# Patient Record
Sex: FEMALE | URBAN - METROPOLITAN AREA
[De-identification: names, ages, dates, MRNs, and addresses within clinical notes are randomized per-mention and may not be internally consistent; named-entity substitution may affect disease eponyms.]

---

## 2022-03-30 ENCOUNTER — APPOINTMENT (OUTPATIENT)
Dept: URBAN - METROPOLITAN AREA CLINIC 256 | Age: 27
Setting detail: DERMATOLOGY
End: 2022-03-30

## 2022-03-30 VITALS — RESPIRATION RATE: 15 BRPM | WEIGHT: 120 LBS | HEIGHT: 66 IN

## 2022-03-30 DIAGNOSIS — L65.9 NONSCARRING HAIR LOSS, UNSPECIFIED: ICD-10-CM

## 2022-03-30 PROCEDURE — OTHER ADDITIONAL NOTES: OTHER

## 2022-03-30 PROCEDURE — OTHER MIPS QUALITY: OTHER

## 2022-03-30 PROCEDURE — OTHER COUNSELING: OTHER

## 2022-03-30 PROCEDURE — 99202 OFFICE O/P NEW SF 15 MIN: CPT

## 2022-03-30 ASSESSMENT — LOCATION ZONE DERM: LOCATION ZONE: SCALP

## 2022-03-30 ASSESSMENT — LOCATION DETAILED DESCRIPTION DERM: LOCATION DETAILED: LEFT SUPERIOR PARIETAL SCALP

## 2022-03-30 ASSESSMENT — LOCATION SIMPLE DESCRIPTION DERM: LOCATION SIMPLE: SCALP

## 2022-03-30 NOTE — HPI: HAIR LOSS
Previous Labs: Yes
Additional History: She first noticed it in photos last September or October. It’s hard to tell as she had a lot of hair before. Her father does have alopecia totalis, started when he was 36, has no hair, lashes, brows. Her brother has male pattern baldness which started very young in high school. She wants a clear diagnosis. \\nSivelisse has had a lot of stress, about three months before her hair loss started. She thought it would continue for 3-6 months but then it got worse in month 7. Stress was school, moving, finance. She didn’t have Covid to her knowledge but did have some flu like illness at some point. She has food allergies and cut out dairy and gluten about two years ago and wonders if she has a weird vitamin deficiency. She takes multivitamins and hair gummies.
When Were The Labs Drawn? (Drawn...): Thyroid labs
Lab Details: TSH negative

## 2022-03-30 NOTE — PROCEDURE: ADDITIONAL NOTES
Render Risk Assessment In Note?: no
Detail Level: Simple
Additional Notes: Initiate your minoxidil. Will consider scalp biopsy if all tests are negative. Briefly discussed the potential association of PCOS and hairloss as pt does state she has irregular periods. Venipuncture attempted in both right and left AC, no specimen obtained. Sent with lab order for CBC, CMP, Iron and ferritin panel, vitamin D, Zinc, and DHEAS. Patient already had normal thyroid done.
Additional Notes: Continue prenatal vitamin\\n\\nA clinical assistant was present for the exam. Care instructions were explained to the patient in detail. Told patient to call with any concerns or questions. The patient verbalized understanding and agreement of the education provided and the treatment plan. Encouraged patient to schedule a follow up appointment right after visit. At the end of the visit, all questions had been answered and the patient was satisfied with the visit.

## 2023-04-16 LAB — SARS-COV-2 RNA RESP QL NAA+PROBE: NEGATIVE

## 2023-04-16 PROCEDURE — C9803 HOPD COVID-19 SPEC COLLECT: HCPCS

## 2023-04-16 PROCEDURE — U0005 INFEC AGEN DETEC AMPLI PROBE: HCPCS | Performed by: EMERGENCY MEDICINE

## 2023-04-16 PROCEDURE — 99285 EMERGENCY DEPT VISIT HI MDM: CPT | Mod: 25,CS

## 2023-04-16 ASSESSMENT — ENCOUNTER SYMPTOMS: NERVOUS/ANXIOUS: 1

## 2023-04-17 ENCOUNTER — HOSPITAL ENCOUNTER (OUTPATIENT)
Facility: CLINIC | Age: 28
Setting detail: OBSERVATION
Discharge: HOME | End: 2023-04-18
Attending: EMERGENCY MEDICINE | Admitting: EMERGENCY MEDICINE
Payer: COMMERCIAL

## 2023-04-17 DIAGNOSIS — F41.9 ANXIETY: ICD-10-CM

## 2023-04-17 DIAGNOSIS — F33.1 MAJOR DEPRESSIVE DISORDER, RECURRENT EPISODE, MODERATE (H): ICD-10-CM

## 2023-04-17 DIAGNOSIS — F41.1 GAD (GENERALIZED ANXIETY DISORDER): ICD-10-CM

## 2023-04-17 DIAGNOSIS — F90.0 ATTENTION DEFICIT HYPERACTIVITY DISORDER (ADHD), PREDOMINANTLY INATTENTIVE TYPE: ICD-10-CM

## 2023-04-17 PROCEDURE — 250N000013 HC RX MED GY IP 250 OP 250 PS 637: Performed by: PSYCHIATRY & NEUROLOGY

## 2023-04-17 PROCEDURE — 99223 1ST HOSP IP/OBS HIGH 75: CPT | Performed by: PSYCHIATRY & NEUROLOGY

## 2023-04-17 PROCEDURE — 250N000013 HC RX MED GY IP 250 OP 250 PS 637: Performed by: STUDENT IN AN ORGANIZED HEALTH CARE EDUCATION/TRAINING PROGRAM

## 2023-04-17 PROCEDURE — G0378 HOSPITAL OBSERVATION PER HR: HCPCS

## 2023-04-17 PROCEDURE — 90791 PSYCH DIAGNOSTIC EVALUATION: CPT

## 2023-04-17 RX ORDER — CITALOPRAM HYDROBROMIDE 20 MG/1
20 TABLET ORAL DAILY
Status: DISCONTINUED | OUTPATIENT
Start: 2023-04-17 | End: 2023-04-18 | Stop reason: HOSPADM

## 2023-04-17 RX ORDER — DEXTROAMPHETAMINE SACCHARATE, AMPHETAMINE ASPARTATE, DEXTROAMPHETAMINE SULFATE AND AMPHETAMINE SULFATE 5; 5; 5; 5 MG/1; MG/1; MG/1; MG/1
TABLET ORAL
COMMUNITY

## 2023-04-17 RX ORDER — SPIRONOLACTONE 50 MG/1
50 TABLET, FILM COATED ORAL DAILY
COMMUNITY

## 2023-04-17 RX ORDER — TRAZODONE HYDROCHLORIDE 50 MG/1
50 TABLET, FILM COATED ORAL
Status: DISCONTINUED | OUTPATIENT
Start: 2023-04-17 | End: 2023-04-18 | Stop reason: HOSPADM

## 2023-04-17 RX ORDER — MINOXIDIL 2.5 MG/1
1.25 TABLET ORAL DAILY
COMMUNITY

## 2023-04-17 RX ORDER — OLANZAPINE 5 MG/1
5 TABLET, ORALLY DISINTEGRATING ORAL EVERY 6 HOURS PRN
Status: DISCONTINUED | OUTPATIENT
Start: 2023-04-17 | End: 2023-04-18 | Stop reason: HOSPADM

## 2023-04-17 RX ORDER — BUSPIRONE HYDROCHLORIDE 5 MG/1
5 TABLET ORAL 3 TIMES DAILY PRN
COMMUNITY
End: 2023-04-17

## 2023-04-17 RX ORDER — LANOLIN ALCOHOL/MO/W.PET/CERES
3 CREAM (GRAM) TOPICAL
Status: DISCONTINUED | OUTPATIENT
Start: 2023-04-17 | End: 2023-04-18 | Stop reason: HOSPADM

## 2023-04-17 RX ORDER — HYDROXYZINE HYDROCHLORIDE 25 MG/1
25 TABLET, FILM COATED ORAL 3 TIMES DAILY PRN
Status: DISCONTINUED | OUTPATIENT
Start: 2023-04-17 | End: 2023-04-18 | Stop reason: HOSPADM

## 2023-04-17 RX ORDER — HYDROXYZINE HYDROCHLORIDE 50 MG/1
50 TABLET, FILM COATED ORAL 3 TIMES DAILY PRN
Status: DISCONTINUED | OUTPATIENT
Start: 2023-04-17 | End: 2023-04-17

## 2023-04-17 RX ORDER — LORAZEPAM 1 MG/1
1 TABLET ORAL EVERY 4 HOURS PRN
Status: DISCONTINUED | OUTPATIENT
Start: 2023-04-17 | End: 2023-04-17

## 2023-04-17 RX ORDER — CITALOPRAM HYDROBROMIDE 20 MG/1
20 TABLET ORAL DAILY
Qty: 15 TABLET | Refills: 1 | Status: SHIPPED | OUTPATIENT
Start: 2023-04-17

## 2023-04-17 RX ADMIN — MELATONIN TAB 3 MG 3 MG: 3 TAB at 01:50

## 2023-04-17 RX ADMIN — HYDROXYZINE HYDROCHLORIDE 25 MG: 25 TABLET, FILM COATED ORAL at 13:05

## 2023-04-17 RX ADMIN — CITALOPRAM HYDROBROMIDE 20 MG: 20 TABLET ORAL at 12:52

## 2023-04-17 RX ADMIN — HYDROXYZINE HYDROCHLORIDE 50 MG: 50 TABLET, FILM COATED ORAL at 04:25

## 2023-04-17 ASSESSMENT — ACTIVITIES OF DAILY LIVING (ADL)
ADLS_ACUITY_SCORE: 35

## 2023-04-17 ASSESSMENT — COLUMBIA-SUICIDE SEVERITY RATING SCALE - C-SSRS
6. HAVE YOU EVER DONE ANYTHING, STARTED TO DO ANYTHING, OR PREPARED TO DO ANYTHING TO END YOUR LIFE?: NO
1. IN THE PAST MONTH, HAVE YOU WISHED YOU WERE DEAD OR WISHED YOU COULD GO TO SLEEP AND NOT WAKE UP?: YES
1. HAVE YOU WISHED YOU WERE DEAD OR WISHED YOU COULD GO TO SLEEP AND NOT WAKE UP?: YES
2. HAVE YOU ACTUALLY HAD ANY THOUGHTS OF KILLING YOURSELF?: NO
ATTEMPT LIFETIME: NO
REASONS FOR IDEATION PAST MONTH: EQUALLY TO GET ATTENTION, REVENGE, OR A REACTION FROM OTHERS AND TO END/STOP THE PAIN
TOTAL  NUMBER OF INTERRUPTED ATTEMPTS LIFETIME: NO
TOTAL  NUMBER OF ABORTED OR SELF INTERRUPTED ATTEMPTS LIFETIME: NO
REASONS FOR IDEATION LIFETIME: EQUALLY TO GET ATTENTION, REVENGE, OR A REACTION FROM OTHERS AND TO END/STOP THE PAIN
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
## 2023-04-17 NOTE — ED NOTES
Patient experiencing a panic attack and feels her life is spinning out of control. Medicated with Hydroxyzine 25 mg. Would like to go to her out patient Doctor's appointment on Tuesday so she can get her medication ordered. Dr Tiara Multani is her provider.

## 2023-04-17 NOTE — ED TRIAGE NOTES
Pt has had anxiety and panic attacks for the last year    Pt came in today to get help with managing her anxiety    Pt states that her home environment triggers her    Pt takes sertraline for anxiety     Pt was  Referred to come in by cope     pt contracts for safety

## 2023-04-17 NOTE — PLAN OF CARE
Génesis Patel  April 17, 2023  Plan of Care Hand-off Note     Patient Care Path: Observation    Plan for Care:     Pt presents to the ED with concern of increase anxiety, depression, and passive suicidal ideation. Pt reports worsening symptoms including lack of appetite, sleep disturbance, impacting her daily functioning, dissociative symptoms, and recent passive suicidal ideation.   A lower level of care has been unsuccessful in treating and stabilizing patient s mental health symptoms. [facility] monitoring, treatment and therapeutic intervention of mental health symptoms. Observation at [facility] could help mitigate the need for a more restrictive level of care in an inpatient setting.    Recommendation of reassessment on 04/18/2023 to assess severity of anxiety and depressive symptoms, impact of new medication, and discussing next level of care and appropriate resources.    Critical Safety Issues: increase in anxiety, depression, recent passive suicidal ideation.    Overview:  This patient is a child/adolescent: No    This patient has additional special visitor precautions: No    Legal Status: Voluntary    Contacts:   Conrado Koroma, friend: Contact [phone]    Psychiatry Consult:  Pt will have access to a psychiatric provider while at [facility].    Updated RN and Consulting Psychiatric Provider regarding plan of care.    Kem Hernandez, [facility]

## 2023-04-17 NOTE — ED NOTES
4/16/2023  Génesis Patel 1995     Writer consulted with ED provider, logan staff,  on this date at 3:15:00:AM AM. It was determined that pt would not benefit from assessment at this time due to Pt unable able to participate in assessment-- pt sleeping staff did not want to wake    ED will call DEC at [phone] when pt is ready and able to participate in assessment. (unless there is [facility] available at that time)     OR DEC order has been closed at this time.    Jaja Suarez, [facility]
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
## 2023-04-17 NOTE — ED PROVIDER NOTES
[facility] Unit - Initial Psychiatric Observation Note  [facility] Emergency Department  Observation Initiation Date: Apr 16, 2023    Génesis Patel MRN: [id number]   Age: 27 year old YOB: 1995     History     Chief Complaint   Patient presents with     Anxiety     HPI  Génesis Patel is a 27 year old female with a past history notable for major depressive disorder and generalized anxiety disorder with occasional panic attacks.  She also reports a historical diagnosis of ADHD.  Records indicate that she presented to the emergency department reporting worsening depressed mood and heightened anxiety.  She was determined to be medically stable and transferred to the [facility] unit for psychiatric assessment.  On examination, she recalls that over the past few months, she notes increased psychosocial stressors attributed to a change in her living environment.  She admits that limited adherence to her prescribed medications further contributes to escalating mood and anxiety symptoms.  She notes that she often dissociates and experiences panic attacks.  She feels overwhelmed by routine tasks.  She denied suicidal and homicidal thoughts.  There was no indication of psychosis.  She notes that she has been prescribed Zoloft however rarely takes the medicine due to her own inability to keep up with a reliable schedule of taking the medicine.  She has also struggled with maintaining regular adherence to Adderall which she attributes to some hesitancy from her prescriber and prescribing the medication.  She states that she has been on this medication for nearly 20 years and struggles to function without access to the medication.  Due to the difficulties she has had with her most recent prescriber, her care has been transferred to a different prescriber within the same clinic.  That appointment is set to occur tomorrow.  Her treatment goals are to gain medications that may more effectively provide relief of her mood  "and anxiety symptoms, inquiring about as needed medications for managing anxiety and considering changing antidepressants to Celexa.  She further adds that she has a friend or 2 who take this medication and are doing quite well.  She does not feel ready to discharge back to the community today due to fear that symptoms will continue to worsen.    Past Medical History  No past medical history on file.  No past surgical history on file.  No current outpatient medications on file.    No Known Allergies  Family History  No family history on file.  Social History   Social History     Tobacco Use     Smoking status: Never     Smokeless tobacco: Never          Review of Systems  A medically appropriate review of systems was performed with pertinent positives and negatives noted in the HPI, and all other systems negative.    Physical Examination   BP: 119/84  Pulse: 61  Temp: 98  F (36.7  C)  Resp: 16  Height: 167.6 cm (5' 6\")  Weight: 59.9 kg (132 lb)  SpO2: 100 %    Physical Exam  General: Appears stated age.   Neuro: Alert and fully oriented. Extremities appear to demonstrate normal strength on visual inspection.   Integumentary/Skin: no rash visualized, normal color    Psychiatric Examination   Appearance: awake, alert  Attitude:  cooperative  Eye Contact:  fair  Mood:  anxious and sad   Affect:  intensity is blunted  Speech:  clear, coherent  Psychomotor Behavior:  no evidence of tardive dyskinesia, dystonia, or tics  Thought Process:  logical and linear  Associations:  no loose associations  Thought Content:  no evidence of suicidal ideation or homicidal ideation and no evidence of psychotic thought  Insight:  fair  Judgement:  fair  Oriented to:  time, person, and place  Attention Span and Concentration:  fair  Recent and Remote Memory:  fair  Language: able to name/identify objects without impairment  Fund of Knowledge: intact with awareness of current and past events    ED Course        Labs Ordered and Resulted " from Time of ED Arrival to Time of ED Departure   COVID-19 VIRUS (CORONAVIRUS) BY PCR - Normal       Result Value    SARS CoV2 PCR Negative         Assessments & Plan (with Medical Decision Making)   Patient presenting with depressed mood and heightened anxiety.  She reports limited adherence to Zoloft and difficulty obtaining prescriptions of Adderall which has led to a state of dysfunction and high symptom intensity.  She is seeking ways to better structure her environment to better promote adherence to her psychotropic medications. Nursing notes reviewed noting no acute issues.     I have reviewed the assessment completed by the [facility].     During the observation period, the patient did not require medications for agitation, and did not require restraints/seclusion for patient and/or provider safety.     The patient was found to have a psychiatric condition that would benefit from an observation stay in the emergency department for further psychiatric stabilization and/or coordination of a safe disposition. The observation plan includes serial assessments of psychiatric condition, potential administration of medications if indicated, further disposition pending the patient's psychiatric course during the monitoring period.     Preliminary diagnosis:    ICD-10-CM    1. Anxiety  F41.9       2. CHETAN (generalized anxiety disorder)  F41.1       3. Major depressive disorder, recurrent episode, moderate (H)  F33.1       4. Attention deficit hyperactivity disorder (ADHD), predominantly inattentive type  F90.0            Treatment Plan:  -Discontinue Zoloft to allow for a trial of Celexa as per patient's preference.  Celexa will target symptoms of CHETAN and MDD.  Risks and benefits have been reviewed.  Celexa will be started at a dose of 20 mg daily.  -Vistaril at a dose of 25 mg 3 times a day as needed for reduction of anxiety will also be available until Celexa has provided adequate reduction of symptoms.  -She will follow  up with her outpatient prescriber to discuss future prescriptions of Adderall.  [place] prescription monitoring database was reviewed noting that her last prescription was about 1 month ago.  The database shows a prescription history dating back to about 1 year ago in regards to prescriptions of stimulants.  -Resources for outpatient mental health treatment  -The patient is not interested in pursuing IRTS facilities at this time  -The patient is declining resources for IOP  -Observation status as we monitor her response to the treatment plan outlined above.  Consider transitioning to a crisis facility tomorrow.    --  Wilbert Hooper MD   [facility] EMERGENCY DEPT  [facility] Unit  4/16/2023        Wilbert Hooper MD  04/17/23 [id number]

## 2023-04-18 VITALS
OXYGEN SATURATION: 97 % | HEART RATE: 76 BPM | SYSTOLIC BLOOD PRESSURE: 108 MMHG | TEMPERATURE: 98.5 F | WEIGHT: 132 LBS | DIASTOLIC BLOOD PRESSURE: 71 MMHG | RESPIRATION RATE: 18 BRPM | HEIGHT: 66 IN | BODY MASS INDEX: 21.21 KG/M2

## 2023-04-18 PROCEDURE — 250N000013 HC RX MED GY IP 250 OP 250 PS 637: Performed by: STUDENT IN AN ORGANIZED HEALTH CARE EDUCATION/TRAINING PROGRAM

## 2023-04-18 PROCEDURE — 99239 HOSP IP/OBS DSCHRG MGMT >30: CPT | Performed by: PSYCHIATRY & NEUROLOGY

## 2023-04-18 PROCEDURE — 250N000013 HC RX MED GY IP 250 OP 250 PS 637: Performed by: PSYCHIATRY & NEUROLOGY

## 2023-04-18 PROCEDURE — G0378 HOSPITAL OBSERVATION PER HR: HCPCS | Mod: CS

## 2023-04-18 RX ORDER — HYDROXYZINE PAMOATE 25 MG/1
25 CAPSULE ORAL 3 TIMES DAILY PRN
Qty: 20 CAPSULE | Refills: 0 | Status: SHIPPED | OUTPATIENT
Start: 2023-04-18

## 2023-04-18 RX ADMIN — CITALOPRAM HYDROBROMIDE 20 MG: 20 TABLET ORAL at 07:53

## 2023-04-18 RX ADMIN — MELATONIN TAB 3 MG 3 MG: 3 TAB at 02:13

## 2023-04-18 ASSESSMENT — ACTIVITIES OF DAILY LIVING (ADL)
ADLS_ACUITY_SCORE: 35

## 2023-04-18 ASSESSMENT — COLUMBIA-SUICIDE SEVERITY RATING SCALE - C-SSRS
SUICIDE, SINCE LAST CONTACT: NO
TOTAL  NUMBER OF ABORTED OR SELF INTERRUPTED ATTEMPTS SINCE LAST CONTACT: NO
1. SINCE LAST CONTACT, HAVE YOU WISHED YOU WERE DEAD OR WISHED YOU COULD GO TO SLEEP AND NOT WAKE UP?: NO
ATTEMPT SINCE LAST CONTACT: NO
TOTAL  NUMBER OF INTERRUPTED ATTEMPTS SINCE LAST CONTACT: NO
6. HAVE YOU EVER DONE ANYTHING, STARTED TO DO ANYTHING, OR PREPARED TO DO ANYTHING TO END YOUR LIFE?: NO
2. HAVE YOU ACTUALLY HAD ANY THOUGHTS OF KILLING YOURSELF?: NO

## 2023-04-18 NOTE — ED NOTES
Pt struggled to fall asleep in sensory room and made a request for Melatonin around 2 am. Pt appeared to be sleeping thereafter. However, it is hard to tell with 100% certainty whether she was resting with her eyes closed or actually sleeping. No other issues overnight, otherwise. Will continue to monitor.

## 2023-04-18 NOTE — PROGRESS NOTES
Pt hesitant about discharge. Pt was encouraged multiple times to make a phone call to arrange transportation. Pt says she needs a little more time to calm down before she can make that phone call.

## 2023-04-18 NOTE — ED PROVIDER NOTES
"EmPATH Unit - Psychiatric Observation Discharge Summary  [facility] Emergency Department  Discharge Date: 4/18/2023    Génesis Patel MRN: [id number]   Age: 27 year old YOB: 1995     Brief HPI & Initial ED Course     Chief Complaint   Patient presents with     Anxiety     HPI  Génesis Patel is a 27 year old female with history notable for major depressive disorder, CHETAN, and ADHD who is currently under observation status on the EmPATH unit, now nearing 39 hours in the emergency department.  Overnight, there were no acute issues.  On reassessment today, the patient reports having a good initial meeting with her new outpatient prescriber.  She slept well last night.  Appetite is fair.  She continues to find the milieu triggering, occasionally worsening her anxiety or making it hard to de-escalate dissociative episodes.  She took a dose of hydroxyzine yesterday to help with that process however found the environment to triggering.  She denies suicidal and homicidal thoughts.  There was no indication of psychosis.  She is tolerating Celexa without side effects.  She is hoping to formulate a concrete treatment plan including a referral to a new outpatient therapist.  Otherwise, she interprets readiness to discharge back to the community.        Physical Examination   BP: 108/71  Pulse: 76  Temp: 98.5  F (36.9  C)  Resp: 18  Height: 167.6 cm (5' 6\")  Weight: 59.9 kg (132 lb)  SpO2: 97 %    Physical Exam  General: Appears stated age.   Neuro: Alert and fully oriented. Extremities appear to demonstrate normal strength on visual inspection.   Integumentary/Skin: no rash visualized, normal color    Psychiatric Examination   Appearance: awake, alert  Attitude:  cooperative  Eye Contact:  fair  Mood:  better  Affect:  appropriate and in normal range  Speech:  clear, coherent  Psychomotor Behavior:  no evidence of tardive dyskinesia, dystonia, or tics  Thought Process:  logical and linear  Associations:  no " loose associations  Thought Content:  no evidence of suicidal ideation or homicidal ideation and no evidence of psychotic thought  Insight:  fair  Judgement:  intact  Oriented to:  time, person, and place  Attention Span and Concentration:  fair  Recent and Remote Memory:  fair  Language: able to name/identify objects without impairment  Fund of Knowledge: intact with awareness of current and past events    Results        Labs Ordered and Resulted from Time of ED Arrival to Time of ED Departure   COVID-19 VIRUS (CORONAVIRUS) BY PCR - Normal       Result Value    SARS CoV2 PCR Negative         Observation Course   The patient was found to have a psychiatric condition that would benefit from an observation stay in the emergency department for further psychiatric stabilization and/or coordination of a safe disposition. The plan upon observation admission included serial assessments of psychiatric condition, potential administration of medications if indicated, further disposition pending the patient's psychiatric course during the monitoring period.     Serial assessments of the patient's psychiatric condition were performed. Nursing notes were reviewed. During the observation period, the patient did not require medications for agitation, and did not require restraints/seclusion for patient and/or provider safety.     After a period of working with the treatment team on the EmPATH unit, the patient's mental state improved to allow a safe transition to outpatient care. After counseling on the diagnosis, work-up, and treatment plan, the patient was discharged. Close follow-up with a psychiatrist and/or therapist was recommended and community psychiatric resources were provided. Patient is to return to the ED if any urgent or potentially life-threatening concerns.     Discharge Diagnoses:   Final diagnoses:   Anxiety   CHETAN (generalized anxiety disorder)   Major depressive disorder, recurrent episode, moderate (H)    Attention deficit hyperactivity disorder (ADHD), predominantly inattentive type       Treatment Plan:  -Continue Celexa 20 mg daily for mood and anxiety management  -Continue hydroxyzine 25 mg 3 times a day as needed for reduction of anxiety  -Continue Adderall for treatment of ADHD.  She will follow-up with her outpatient prescriber to maintain refills.  -Referral for individual psychotherapy  -Discharge home today.      At the time of discharge, the patient's acute suicide risk was determined to be low due to the following factors: Reduction in the intensity of mood/anxiety symptoms that preceded the admission, denial of suicidal thoughts, denies feeling helpless or helpless, not currently under the influence of alcohol or illicit substances, denies experiencing command hallucinations, no immediate access to firearms. The patient's acute risk could be higher if noncompliant with their treatment plan, medications, follow-up appointments or using illicit substances or alcohol. Protective factors include: social supports, employment    I spent more than 30 minutes on discharge day activities.    --  Wilbert Hooper MD  [facility] EMERGENCY DEPT  EmPATH Unit  4/18/2023      Wilbert Hooper MD  04/18/23 [id number]

## 2023-04-18 NOTE — PROGRESS NOTES
"[facility] Crisis Reassessment      Génesis Patel was reassessed for the following reasons: Observation status at [facility]. Pt was first seen on 04/17/2023 by Kem Hernandez [facility]; see the initial assessment note for details.      Patient Presentation    Initial ED presentation details:   Pt is a 27 year old female with she/her pronouns with a notable history of anxiety, depression, and ADHD who presents to the ED with a friend after being referred here from [place] for worsening anxiety and depression symptoms and passive suicidal ideation.  When prompted by Writer to discuss what brought Pt in, Pt stated \"my friend said 'you have to go.' I've been living with my dad since the pandemic, and I have been in this obsession, ruminating, loop. She said you basically only talk about the same 5 things.\" Pt reports current symptoms of dissociating, panic attacks, lack of appetite, sleep disturbance, passive suicidal ideation, and muscle tension throughout her body. Pt reports she has been experiencing these symptoms since moving in with her father in January 2022. Pt endorses recent passive suicidal ideation, yet denies any active/current suicidal ideation, plan, or intent. Pt reports she does not want to die, yet feels at time it may be easier if she was not alive and having to deal with her life stressors. Pt denies any suicide attempts or engaging in any NSSIB. Pt denies any HI, AH/VH, or active substance abuse. Pt reports working with established outpatient providers, yet identifies they also bring added stress to her life. Pt reports she will call her primary clinic and work with whichever PCP is available. Pt reports she has been seeing her OBGYN lately due to concern of not menstruating for the past couple of months, which Pt attributes to her life stressors and mental health symptoms. Pt reports she was established with a provider through [facility] for medication management, yet that provider left in 2022. Pt reports " being referred to a different provider and feels as though she is not being heard and had scheduling conflicts with her, which has resulted in missing medication, and the psychiatrist was the individual who had Pt go to the ED in January 2023 for suicidal ideation. Pt reports she has been referred to another psychiatric provider and the appointment is for tomorrow on 04/18/2023. Pt reports working with a therapist through [facility] and their rapport is unclear. When discussing medication, Pt reports she has had trouble securing her ADHD medication due to missing appointments and then due to lack of medication, feels as though her mental health deteriorates. Because of this, Pt's adherence to medication is uncertain.    Current patient presentation:   Pt met with Writer willingly for reassessment. Pt reports being able to sleep and eat while on the unit. Pt reports having an appointment with her new psychiatrist at 1100 today and hopes to make the appointment with it being virtual. Pt discussed recent life stressors with Writer about living at home with her father, wanting to move, feelings of embarrassment and shame about her current presentation, and feeling unsure how to start making impactful next steps towards improvement. Writer validated Pt's feelings. Writer and Pt engaged in voicing all of Pt's concerns and thoughts about certain topics such as moving, finding a new job, moving to a new city and challenged the negative viewpoints and countered them with different perspectives Pt can take on the situation. After doing so for a while, it was close to time for Pt to have her appointment with her psychiatrist.    After Pt completed her appointment with her psychiatrist and meeting with Dr. Hooper on the unit, Writer and Pt met again for an update and to discuss disposition planning. Pt reports feeling better after meeting with Writer, psychiatrist, and Dr. Hooper due to feeling heard and feeling as  though she is not crazy and what she is experiencing can be worked through. Writer and Pt discuss Pt may be engaging in confirmation bias and self-fulfilling prophecies due to her anxiety and desire to be right. Pt acknowledged this and felt better. Pt reports it was helpful to hear from all 3 that Pt can be helped and all hope is not lost. Writer and Pt discussed grounding techniques, assisted with scheduling a new therapist for Pt, discussed different aspects of self-care, and appropriately engaged in and completed an aftercare/safety plan. Pt denies any active/current SI, HI, AH/VH, or substance use.      Changes observed since initial assessment:   Denies passive suicidal ideation, plan, or intent. Pt reports increase in hopefulness, decrease in anxiety.      Risk of Harm    [place] Suicide Severity Rating Scale Full Clinical Version: 04/17/2023  Suicidal Ideation  1. Wish to be Dead (Lifetime): Yes  1. Wish to be Dead (Past 1 Month): Yes  2. Non-Specific Active Suicidal Thoughts (Lifetime): No  Intensity of Ideation  Most Severe Ideation Rating (Lifetime): 4  Most Severe Ideation Rating (Past 1 Month): 4  Frequency (Lifetime): 2-5 times in week  Frequency (Past 1 Month): Once a week  Duration (Lifetime): Fleeting, few seconds or minutes  Duration (Past 1 Month): Fleeting, few seconds or minutes  Controllability (Lifetime): Easily able to control thoughts  Controllability (Past 1 Month): Easily able to control thoughts  Deterrents (Lifetime): Deterrents probably stopped you  Deterrents (Past 1 Month): Deterrents probably stopped you  Reasons for Ideation (Lifetime): Equally to get attention, revenge, or a reaction from others and to end/stop the pain  Reasons for Ideation (Past 1 Month): Equally to get attention, revenge, or a reaction from others and to end/stop the pain  Suicidal Behavior  Actual Attempt (Lifetime): No  Has subject engaged in non-suicidal self-injurious behavior? (Lifetime): No  Interrupted  Attempts (Lifetime): No  Aborted or Self-Interrupted Attempt (Lifetime): No  Preparatory Acts or Behavior (Lifetime): No  C-SSRS Risk (Lifetime/Recent)  Calculated C-SSRS Risk Score (Lifetime/Recent): Low Risk    [place] Suicide Severity Rating Scale Since Last Contact: 04/18/2023  Suicidal Ideation (Since Last Contact)  1. Wish to be Dead (Since Last Contact): No  2. Non-Specific Active Suicidal Thoughts (Since Last Contact): No  Suicidal Behavior (Since Last Contact)  Actual Attempt (Since Last Contact): No  Has subject engaged in non-suicidal self-injurious behavior? (Since Last Contact): No  Interrupted Attempts (Since Last Contact): No  Aborted or Self-Interrupted Attempt (Since Last Contact): No  Preparatory Acts or Behavior (Since Last Contact): No  Suicide (Since Last Contact): No     C-SSRS Risk (Since Last Contact)  Calculated C-SSRS Risk Score (Since Last Contact): No Risk Indicated    Validity of evaluation is not impacted by presenting factors during interview.   Comments regarding subjective versus objective responses to Rice tool: na  Environmental or Psychosocial Events: work or task failure, challenging interpersonal relationships, helplessness/hopelessness, unemployment/underemployment, unstable housing, homelessness, other use of prescription medication and neither working nor attending school  Chronic Risk Factors: history of psychiatric hospitalization, chronic and ongoing sleep difficulties, history of abuse or neglect, parental mental health issue, parent divorce and parental substance abuse issue   Warning Signs: hopelessness, feeling trapped, like there is no way out, withdrawing from friends, family, and society, anxiety, agitation, unable to sleep, sleeping all the time, no reason for living, no sense of purpose in life and recent discharges from emergency department or inpatient psychiatric care  Protective Factors: good treatment engagement, sense of importance of health and  wellness, supportive ongoing medical and mental health care relationships and help seeking  Interpretation of Risk Scoring, Risk Mitigation Interventions and Safety Plan:  Pt scores at no risk indicated. Pt denies any current suicidal ideation and has not experienced any since being at [facility] and denies any plan, intent, or desire to harm herself. Pt continues to report feeling safe while on at [facility]. Pt has no history of suicide attempts or NSSIB.      Does the patient have thoughts of harming others? No    Mental Status Exam   Affect: Appropriate   Appearance: Appropriate    Attention Span/Concentration: Attentive?    Eye Contact: Engaged   Fund of Knowledge: Appropriate    Language /Speech Content: Fluent   Language /Speech Volume: Normal    Language /Speech Rate/Productions: Normal    Recent Memory: Intact   Remote Memory: Intact   Mood: Other: better, less anxious.    Orientation to Person: Yes    Orientation to Place: Yes   Orientation to Time of Day: Yes    Orientation to Date: Yes    Situation (Do they understand why they are here?): Yes    Psychomotor Behavior: Normal    Thought Content: Clear   Thought Form: Intact       Additional Collateral Information   NA      Therapeutic Intervention  The following therapeutic methodologies were employed when working with the patient: Active listening, Assess dimensions of crisis, Apply solution-focused therapy to address current crisis, Identify additional supports and alternative coping skills, Establish a discharge plan and Safety planning. Patient response to intervention: calm, cooperative, and engaged.    Diagnosis:   300.02 (F41.1) Generalized Anxiety Disorder - primary and - by history   296.32 (F33.1) Major Depressive Disorder, Recurrent Episode, Moderate _ - by history   Attention-Deficit/Hyperactivity Disorder  314.01 (F90.9) Unspecified Attention -Deficit / Hyperactivity Disorder - by history     Clinical Substantiation of Recommendations  Pt is a 27 year  old female with she/her pronouns with a notable history of anxiety, depression, and ADHD who presents to the ED with a friend after being referred here from [facility] for worsening anxiety and depression symptoms and passive suicidal ideation. Pt reports current symptoms of dissociating, panic attacks, lack of appetite, sleep disturbance, passive suicidal ideation, and muscle tension throughout her body. Pt reports she has been experiencing these symptoms since moving in with her father in January 2022. Pt endorses recent passive suicidal ideation, yet denies any active/current suicidal ideation, plan, or intent.    After mental health crisis assessment and aftercare planning by [facility] care team and consultation with attending provider, the patient's circumstances and mental state were safe for outpatient management. Patient denies any mental health or safety concerns at this time. Close follow-up with established psychiatrist and newly scheduled therapist was recommended. Patient is to return to the ED if any urgent or potentially life-threatening concerns arise.        At the time of discharge, the patient's acute suicide risk was determined to be low due to the following factors: reduction in the intensity of mood/anxiety symptoms that preceded the admission, denial of suicidal thoughts, denies feeling helpless or hopeless, not currently under the influence of alcohol or illicit substances, denies experiencing command hallucinations and no immediate access to firearms. Protective factors include: social support, displays resiliency , future focused thinking, displays insight, and help seeking behaviors.       Plan:    Disposition  Recommended disposition: Individual Therapy and Medication Management      Reviewed case and recommendations with attending provider. Attending Name: Dr. Hooper      Attending concurs with disposition: Yes      Patient and/or verified legal guardian concurs with disposition: Yes      Final  disposition: Individual therapy  and Medication management.         Assessment Details  Total duration spent on the patient case in minutes: 2.0 hrs     CPT code(s) utilized: 54692 - Psychotherapy (with patient) - 60 (53+*) min       Kem Hernandez, [place] [facility], [facility]  Callback: [phone]      Aftercare Plan  If I am feeling unsafe or I am in a crisis, I will:   Contact my established care providers   Call the [place] Suicide Prevention Lifeline: 988  Go to the nearest emergency room   Call 401         Warning signs that I or other people might notice when a crisis is developing for me: Lack of everyday self-care, completing ADLs, lack of hygiene.     Things I am able to do on my own to cope or help me feel better: Biking, figure drawing, listen to music.      Things that I am able to do with others to cope or help me better: Pilates with Suki, engage in MyMosa.      Things I can use or do for distraction: Listen to podcasts.      Changes I can make to support my mental health and wellness: Invest in clothing and make up to assist with feeling better about myself. Start looking for jobs and new places to live.      People in my life that I can ask for help: Leoncio Guillen, Argenis Cohn.      Your [facility] has a mental health crisis team you can call 24/7: [facility] Crisis Line Number: [phone]      Other things that are important when I m in crisis: Be physically present, assist with being a problem solver.      Additional resources and appointment information:         Date: Wednesday, 4/19/2023  Time: 10:00 am - 11:00 am  Provider: Jo Ann Alvarez  Location: [facility], [place] [address]  Phone: [phone]  Type: Therapy - Initial (In-Person)           Crisis Lines  Crisis Text Line  Text 967122  You will be connected with a trained live crisis counselor to provide support.     Gambling Hotline  6.425.495.hope [4840]     National Hope  "Line  1.800.SUICIDE [7242115]     National Suicide Prevention Lifeline  Free and confidential support  1.800.293.TALK [8055]  http://suicidepreventionlifeline.org     The Hi Project (LGBTQ Youth Crisis Line)  6.590.699.5561  text START to 100-838     's Crisis Line  6.283.471.6150 (Press 1)  or text 126861     Community Resources  Fast Tracker  Linking people to mental health and substance use disorder resources  eLibs.com.OpenX      [place] Mental Health Warm Line  Peer to peer support  Monday thru Saturday, 12 pm to 10 pm  334.147.7900 or 8.964.716.6397  Text \"Support\" to 51220     National [place] on Mental Illness (DORYS)  413.623.1812 or 1888.DORYS.HELPS     [facility] Counseling Center  Free mental health counseling  [address] [facility]  819.429.7130     Mental Health Apps  My3  https://Jamii/     VirtualHopeBox  https://Deolan/apps/virtual-hope-box/     Suicide Safety Plan (Niche)     Calm Harm        Additional information:  Today you were seen by a licensed mental health professional through Triage and Transition services, [facility] ([facility])  for a crisis assessment in the Emergency Department at [facility].  It is recommended that you follow up with your established providers (psychiatrist, mental health therapist, and/or primary care doctor - as relevant) as soon as possible. Coordinators from [facility] will be calling you in the next 24-48 hours to ensure that you have the resources you need.  You can also contact [facility] coordinators directly at [phone]. You may have been scheduled for or offered an appointment with a mental health provider. [facility] maintains an extensive network of licensed behavioral health providers to connect patients with the services they need.  We do not charge providers a fee to participate in our referral network.  We match patients with providers based on a patient's specific needs, " insurance coverage, and location.  Our first effort will be to refer you to a provider within your care system, and will utilize providers outside your care system as needed.

## 2023-04-18 NOTE — ED NOTES
Patient agreeable to discharge plan. Discharge instructions reviewed with patient including follow-up care plan. Medications: Celexa and Hydroxyzine sent home with patient. Reviewed safety plan and outpatient resources. Denies SI and HI. All belongings that were brought into the hospital have been returned to patient. Escorted off the unit at 1600 accompanied by [facility] staff. Discharged to home via friend.

## 2023-04-18 NOTE — ED NOTES
"Patient approached Writer feeling overwhelmed with what the discharge plan is for tomorrow. Patient was given her options and explained the process of meeting with both the provider and LMHP to discuss disposition options. Patient feels more overwhelmed when \"too much information is given\". For now, she is agreeable to spend the night on observation, but not holdable if she asks to leave. Pleasant and cooperative, but hesitant to take medications after not liking the \"sensation\" the Hydroxyzine PRN made her feel this afternoon. Paces around the unit occassionally. Will continue to monitor.   "

## 2023-04-18 NOTE — DISCHARGE INSTRUCTIONS
If I am feeling unsafe or I am in a crisis, I will:   Contact my established care providers   Call the National Suicide Prevention Lifeline: 988  Go to the nearest emergency room   Call 911       Warning signs that I or other people might notice when a crisis is developing for me: Lack of everyday self-care, completing ADLs, lack of hygiene.    Things I am able to do on my own to cope or help me feel better: Biking, figure drawing, listen to music.     Things that I am able to do with others to cope or help me better: Pilates with Suki, engage in RECESS..     Things I can use or do for distraction: Listen to podcasts.     Changes I can make to support my mental health and wellness: Invest in clothing and make up to assist with feeling better about myself. Start looking for jobs and new places to live.     People in my life that I can ask for help: Leoncio Guillen, Argenis Cohn.     Your [facility] has a mental health crisis team you can call 24/7: [facility] Crisis Line Number: [phone]     Other things that are important when I m in crisis: Be physically present, assist with being a problem solver.     Additional resources and appointment information:       Date: Wednesday, 4/19/2023  Time: 10:00 am - 11:00 am  Provider: Jo Ann Alvarez  Location: [facility], [place] [address]  Phone: [phone]  Type: Therapy - Initial (In-Person)       Crisis Lines  Crisis Text Line  Text 480016  You will be connected with a trained live crisis counselor to provide support.    Gambling Hotline  1.800.333.hope [4673]    National Hope Line  1.800.SUICIDE [8214106]    National Suicide Prevention Lifeline  Free and confidential support  1.800.070.TALK [0335]  http://suicidepreventionlifeline.org    The Hi Project (LGBTQ Youth Crisis Line)  7.115.946.4432  text START to 436-661    [place]'s Crisis Line  2.356.030.1349 (Press 1)  or text  "361737    Community Resources  Fast Tracker  Linking people to mental health and substance use disorder resources  CL[facility] GroupckAccountNown.org     [place] Mental Health Warm Line  Peer to peer support  Monday thru Saturday, 12 pm to 10 pm  568.349.9605 or 9.493.104.8069  Text \"Support\" to 18021    National [place] on Mental Illness (DORYS)  893.061.5356 or 1.888.DOYRS.HELPS    Walk-in Counseling Center  Free mental health counseling  [address] [facility]  263.673.1777    Mental Health Apps  My3  https://[employer]Access Network/    VirtualHopeBox  https://Madeleine [employer]/apps/virtualMiArchhope-box/    Suicide Safety Plan (Qubole)    Calm Harm      Additional information:  Today you were seen by a licensed mental health professional through Triage and Transition services, Behavioral Healthcare Providers ([facility])  for a crisis assessment in the Emergency Department at [facility].  It is recommended that you follow up with your established providers (psychiatrist, mental health therapist, and/or primary care doctor - as relevant) as soon as possible. Coordinators from [facility] will be calling you in the next 24-48 hours to ensure that you have the resources you need.  You can also contact [facility] coordinators directly at [phone]. You may have been scheduled for or offered an appointment with a mental health provider. [facility] maintains an extensive network of licensed behavioral health providers to connect patients with the services they need.  We do not charge providers a fee to participate in our referral network.  We match patients with providers based on a patient's specific needs, insurance coverage, and location.  Our first effort will be to refer you to a provider within your care system, and will utilize providers outside your care system as needed.      "

## 2023-04-21 ENCOUNTER — PATIENT OUTREACH (OUTPATIENT)
Dept: CARE COORDINATION | Facility: CLINIC | Age: 28
End: 2023-04-21
Payer: COMMERCIAL

## 2023-04-21 NOTE — PROGRESS NOTES
Clinic Care Coordination Contact  [facility]: Post-Discharge Note  SITUATION                                                      Admission:    Admission Date: 04/17/23   Reason for Admission: Anxiety  Discharge:   Discharge Date: 04/18/23  Discharge Diagnosis: Anxiety    BACKGROUND                                                      The patient was found to have a psychiatric condition that would benefit from an observation stay in the emergency department for further psychiatric stabilization and/or coordination of a safe disposition. The plan upon observation admission included serial assessments of psychiatric condition, potential administration of medications if indicated, further disposition pending the patient's psychiatric course during the monitoring period.      Serial assessments of the patient's psychiatric condition were performed. Nursing notes were reviewed. During the observation period, the patient did not require medications for agitation, and did not require restraints/seclusion for patient and/or provider safety.      After a period of working with the treatment team on the [facility] unit, the patient's mental state improved to allow a safe transition to outpatient care. After counseling on the diagnosis, work-up, and treatment plan, the patient was discharged. Close follow-up with a psychiatrist and/or therapist was recommended and community psychiatric resources were provided. Patient is to return to the ED if any urgent or potentially life-threatening concerns.        ASSESSMENT           Discharge Assessment  How are you doing now that you are home?: Patient shares that she is doing well, especially now with being back on medication. Patient shares that she had her first therapy appt but was moved to a different provider that doesn't specialize in ADHD. Patient did not get an explanation as to why she was moved. Patient would like a therapist that specifically helps with ADHD and would  appreciate a new referral.  How are your symptoms? (Red Flag symptoms escalate to triage hotline per guidelines): Improved  Do you feel your condition is stable enough to be safe at home until your provider visit?: Yes  Does the patient have their discharge instructions? : Yes  Does the patient have questions regarding their discharge instructions? : No  Were you started on any new medications or were there changes to any of your previous medications? : Yes  Does the patient have all of their medications?: Yes  Do you have questions regarding any of your medications? : No  Do you have all of your needed medical supplies or equipment (DME)?  (i.e. oxygen tank, CPAP, cane, etc.): Yes  Discharge follow-up appointment scheduled within 14 calendar days? : Yes  Discharge Follow Up Appointment Date: 04/19/23  Discharge Follow Up Appointment Scheduled with?: Specialty Care Provider (Therapy)    Post-op ([facility] Only)  If the patient had a surgery or procedure, do they have any questions for a nurse?: No    Post-op (Clinicians Only)  Did the patient have surgery or a procedure: No  Fever: No  Chills: No        PLAN                                                      Outpatient Plan:    Date: Wednesday, 4/19/2023 Time: 10:00 am - 11:00 am  Provider: Jo Ann Alvarez  Location: [address] [address]  Phone: [phone]  Type: Therapy - Initial (In-Person)  Crisis    No future appointments.      For any urgent concerns, please contact our 24 hour nurse triage line: [phone] ([phone])         GERARDO Mauricio

## 2023-04-21 NOTE — PROGRESS NOTES
Clinic Care Coordination Contact  Care Team Conversations    Writer sent referral to [facility] requesting therapist for ADHD and & nervous system regulation.     Called patient and relayed above information. Patient is very appreciative of new referral. Writer let's pt know they can expect to hear from [facility] within 2-3 business days.    GERARDO Cruz   Social Work Clinic Care Coordinator   [facility]  PH: [phone]  [email or web address]

## 2023-05-23 ENCOUNTER — HOSPITAL ENCOUNTER (EMERGENCY)
Facility: CLINIC | Age: 28
Discharge: HOME | End: 2023-05-23
Attending: EMERGENCY MEDICINE | Admitting: EMERGENCY MEDICINE
Payer: COMMERCIAL

## 2023-05-23 VITALS
RESPIRATION RATE: 18 BRPM | DIASTOLIC BLOOD PRESSURE: 86 MMHG | HEART RATE: 86 BPM | SYSTOLIC BLOOD PRESSURE: 128 MMHG | OXYGEN SATURATION: 100 % | TEMPERATURE: 97.6 F

## 2023-05-23 DIAGNOSIS — F41.9 ANXIETY: ICD-10-CM

## 2023-05-23 LAB — SARS-COV-2 RNA RESP QL NAA+PROBE: NEGATIVE

## 2023-05-23 PROCEDURE — C9803 HOPD COVID-19 SPEC COLLECT: HCPCS

## 2023-05-23 PROCEDURE — 87635 SARS-COV-2 COVID-19 AMP PRB: CPT | Performed by: EMERGENCY MEDICINE

## 2023-05-23 PROCEDURE — 99283 EMERGENCY DEPT VISIT LOW MDM: CPT
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
## 2023-06-04 ENCOUNTER — HOSPITAL ENCOUNTER (EMERGENCY)
Facility: CLINIC | Age: 28
Discharge: HOME | End: 2023-06-04
Attending: EMERGENCY MEDICINE | Admitting: EMERGENCY MEDICINE
Payer: COMMERCIAL

## 2023-06-04 VITALS
OXYGEN SATURATION: 97 % | HEART RATE: 100 BPM | SYSTOLIC BLOOD PRESSURE: 124 MMHG | TEMPERATURE: 98 F | RESPIRATION RATE: 18 BRPM | DIASTOLIC BLOOD PRESSURE: 88 MMHG

## 2023-06-04 DIAGNOSIS — F41.9 ANXIETY: ICD-10-CM

## 2023-06-04 DIAGNOSIS — R45.851 SUICIDAL IDEATION: ICD-10-CM

## 2023-06-04 LAB — SARS-COV-2 RNA RESP QL NAA+PROBE: NEGATIVE

## 2023-06-04 PROCEDURE — 99283 EMERGENCY DEPT VISIT LOW MDM: CPT

## 2023-06-04 PROCEDURE — 87635 SARS-COV-2 COVID-19 AMP PRB: CPT | Performed by: EMERGENCY MEDICINE

## 2023-06-04 ASSESSMENT — ACTIVITIES OF DAILY LIVING (ADL): ADLS_ACUITY_SCORE: 35
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
## 2023-06-04 NOTE — ED NOTES
Bed: BH3  Expected date:   Expected time:   Means of arrival:   Comments:  EPPD bringing suicidal  Eta [address]
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
## 2025-07-25 ENCOUNTER — HOSPITAL ENCOUNTER (EMERGENCY)
Facility: CLINIC | Age: 30
Discharge: HOME | End: 2025-07-25
Attending: EMERGENCY MEDICINE | Admitting: EMERGENCY MEDICINE
Payer: COMMERCIAL

## 2025-07-25 VITALS
RESPIRATION RATE: 16 BRPM | WEIGHT: 145 LBS | OXYGEN SATURATION: 97 % | TEMPERATURE: 97.8 F | SYSTOLIC BLOOD PRESSURE: 123 MMHG | DIASTOLIC BLOOD PRESSURE: 82 MMHG | HEIGHT: 67 IN | BODY MASS INDEX: 22.76 KG/M2 | HEART RATE: 79 BPM

## 2025-07-25 DIAGNOSIS — F32.A DEPRESSION, UNSPECIFIED DEPRESSION TYPE: Primary | ICD-10-CM

## 2025-07-25 DIAGNOSIS — F41.9 ANXIETY: ICD-10-CM

## 2025-07-25 PROCEDURE — 99282 EMERGENCY DEPT VISIT SF MDM: CPT

## 2025-07-25 PROCEDURE — 99281 EMR DPT VST MAYX REQ PHY/QHP: CPT | Performed by: EMERGENCY MEDICINE

## 2025-07-25 ASSESSMENT — ACTIVITIES OF DAILY LIVING (ADL)
ADLS_ACUITY_SCORE: 41
ADLS_ACUITY_SCORE: 41
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
## 2025-07-26 NOTE — ED NOTES
[facility]  ED to E[facility] Checklist:      Goal for EMPATH: Depression management and Anxiety management    Current Behavior: Calm and Cooperative    Safety Concerns: None    Legal Hold Status: Voluntary    Medically Cleared by ED provider: Yes    Patient Therapeutically Searched: Not searched - Currently in triage    Belongings: Remain with patient    Independent Ambulation at Baseline: Yes/No: Yes    Participates in Care/Conversation: Yes/No: Yes    Patient Informed about E[facility]: Yes/No: Yes    DEC: Ordered and pending    Patient Ready to be Transferred to [facility]? Yes/No: Yes

## 2025-07-26 NOTE — ED NOTES
Staff from [facility] came to get pt and she was standing outside. Apparently pt had called for a ride and no longer wished to be seen d/t not having cell phone and laptop is not charged.